# Patient Record
Sex: MALE | Race: WHITE | NOT HISPANIC OR LATINO | URBAN - METROPOLITAN AREA
[De-identification: names, ages, dates, MRNs, and addresses within clinical notes are randomized per-mention and may not be internally consistent; named-entity substitution may affect disease eponyms.]

---

## 2022-04-04 ENCOUNTER — TELEPHONE ENCOUNTER (OUTPATIENT)
Dept: URBAN - METROPOLITAN AREA CLINIC 72 | Facility: CLINIC | Age: 37
End: 2022-04-04

## 2022-08-25 ENCOUNTER — WEB ENCOUNTER (OUTPATIENT)
Dept: URBAN - METROPOLITAN AREA CLINIC 113 | Facility: CLINIC | Age: 37
End: 2022-08-25

## 2022-08-25 ENCOUNTER — OFFICE VISIT (OUTPATIENT)
Dept: URBAN - METROPOLITAN AREA CLINIC 113 | Facility: CLINIC | Age: 37
End: 2022-08-25
Payer: COMMERCIAL

## 2022-08-25 VITALS
WEIGHT: 208 LBS | SYSTOLIC BLOOD PRESSURE: 126 MMHG | HEIGHT: 68 IN | BODY MASS INDEX: 31.52 KG/M2 | TEMPERATURE: 98.4 F | DIASTOLIC BLOOD PRESSURE: 90 MMHG | HEART RATE: 86 BPM

## 2022-08-25 DIAGNOSIS — R19.4 CHANGE IN BOWEL HABITS: ICD-10-CM

## 2022-08-25 DIAGNOSIS — K21.9 GASTROESOPHAGEAL REFLUX DISEASE, UNSPECIFIED WHETHER ESOPHAGITIS PRESENT: ICD-10-CM

## 2022-08-25 DIAGNOSIS — K76.0 FATTY LIVER: ICD-10-CM

## 2022-08-25 DIAGNOSIS — R74.8 ELEVATED LIVER ENZYMES: ICD-10-CM

## 2022-08-25 PROCEDURE — 99204 OFFICE O/P NEW MOD 45 MIN: CPT | Performed by: NURSE PRACTITIONER

## 2022-08-25 RX ORDER — TELMISARTAN 40 MG/1
1 TABLET TABLET ORAL ONCE A DAY
Status: ACTIVE | COMMUNITY

## 2022-08-25 RX ORDER — AMLODIPINE BESYLATE 5 MG/1
1 TABLET TABLET ORAL ONCE A DAY
Status: ACTIVE | COMMUNITY

## 2022-08-25 RX ORDER — PANTOPRAZOLE SODIUM 40 MG/1
1 TABLET TABLET, DELAYED RELEASE ORAL ONCE A DAY
Qty: 30 | Refills: 5 | OUTPATIENT
Start: 2022-08-25

## 2022-08-25 RX ORDER — OMEPRAZOLE 40 MG/1
1 CAPSULE 30 MINUTES BEFORE MORNING MEAL CAPSULE, DELAYED RELEASE ORAL ONCE A DAY
Status: ACTIVE | COMMUNITY

## 2022-08-25 NOTE — HPI-OTHER HISTORIES
Labs 3/7/2022:BMP normal with exception of glucose 106.  LFTs: TB 0.5, ALP 99, , AST 55.  Lipid panel normal with exception of triglycerides 278 and HDL 31. Abdominal ultrasound 3/6/2022:Increased echogenicity of liver/hepatic steatosis.

## 2022-08-25 NOTE — HPI-TODAY'S VISIT:
This is a 37-year-old male with a history of hypertension, GERD, liver enzyme elevation, and fatty liver referred from PARMJIT Slaughter at [facility] for evaluation of fatty liver. He denies a prior history of liver enzyme elevation.  He denies a family history of liver disease.  He infrequently drinks alcohol and has not had any in a month.  He reports a history of acid reflux that began this year.  He has been on omeprazole 40 mg daily for the last 1 or 2 months and his symptoms are controlled.  For the last 2 months, he reports 2 or 3 bowel movements per day.  His stools are loose/watery and, when his stools are solid, they are thin.  He denies urgency to defecate or nocturnal stools.  This change in his bowel habits may began when he started omeprazole.  He infrequently ingests dairy products and denies NSAIDs.  He denies red blood per rectum or melena.  He denies any other abdominal symptoms.

## 2022-08-29 ENCOUNTER — TELEPHONE ENCOUNTER (OUTPATIENT)
Dept: URBAN - METROPOLITAN AREA CLINIC 113 | Facility: CLINIC | Age: 37
End: 2022-08-29

## 2022-10-11 LAB
ABSOLUTE BASOPHILS: 52
ABSOLUTE EOSINOPHILS: 291
ABSOLUTE LYMPHOCYTES: 1804
ABSOLUTE MONOCYTES: 525
ABSOLUTE NEUTROPHILS: 2527
ACTIN (SMOOTH MUSCLE) ANTIBODY (IGG): <20
ALBUMIN/GLOBULIN RATIO: 1.6
ALBUMIN: 4.5
ALKALINE PHOSPHATASE: 112
ALT (SGPT): 88
ANA SCREEN, IFA: NEGATIVE
AST (SGOT): 41
BASOPHILS: 1
BILIRUBIN, DIRECT: 0.2
BILIRUBIN, INDIRECT: 0.8
BILIRUBIN, TOTAL: 1
CERULOPLASMIN: 26
CLIENT CONTACT:: (no result)
COMMENT: (no result)
EOSINOPHILS: 5.6
FERRITIN, SERUM: 464
GLOBULIN: 2.9
HEMATOCRIT: 46.2
HEMOGLOBIN: 16.1
HEPATITIS A AB, TOTAL: (no result)
HEPATITIS B CORE AB TOTAL: (no result)
HEPATITIS B SURFACE AB IMMUNITY, QN: 47
HEPATITIS B SURFACE ANTIGEN: (no result)
HEPATITIS C ANTIBODY: (no result)
HEREDITARY HEMOCHROMATOSIS DNA MUT: (no result)
INDEX: 0.03
INR: 1.1
IRON BIND.CAP.(TIBC): 387
IRON SATURATION: 42
IRON: 164
LYMPHOCYTES: 34.7
Lab: (no result)
Lab: (no result)
MCH: 30.3
MCHC: 34.8
MCV: 87
MITOCHONDRIAL (M2) ANTIBODY: (no result)
MONOCYTES: 10.1
MPV: 8.1
NEUTROPHILS: 48.6
PLATELET COUNT: 248
PROTEIN, TOTAL: 7.4
PT: 10.9
RDW: 13
RED BLOOD CELL COUNT: 5.31
REPORT ALWAYS MESSAGE SIGNATURE: (no result)
T-TRANSGLUTAMINASE (TTG) IGA: <1
TEST CODE:: (no result)
TEST NAME:: (no result)
WHITE BLOOD CELL COUNT: 5.2

## 2022-10-28 ENCOUNTER — OFFICE VISIT (OUTPATIENT)
Dept: URBAN - METROPOLITAN AREA CLINIC 113 | Facility: CLINIC | Age: 37
End: 2022-10-28
Payer: COMMERCIAL

## 2022-10-28 VITALS
HEART RATE: 89 BPM | BODY MASS INDEX: 32.28 KG/M2 | SYSTOLIC BLOOD PRESSURE: 140 MMHG | HEIGHT: 68 IN | TEMPERATURE: 97.3 F | RESPIRATION RATE: 18 BRPM | WEIGHT: 213 LBS | DIASTOLIC BLOOD PRESSURE: 102 MMHG

## 2022-10-28 DIAGNOSIS — R19.4 CHANGE IN BOWEL HABITS: ICD-10-CM

## 2022-10-28 DIAGNOSIS — K21.9 GASTROESOPHAGEAL REFLUX DISEASE, UNSPECIFIED WHETHER ESOPHAGITIS PRESENT: ICD-10-CM

## 2022-10-28 DIAGNOSIS — R79.89 ELEVATED FERRITIN: ICD-10-CM

## 2022-10-28 DIAGNOSIS — K76.0 FATTY LIVER: ICD-10-CM

## 2022-10-28 DIAGNOSIS — R74.8 ELEVATED LIVER ENZYMES: ICD-10-CM

## 2022-10-28 PROBLEM — 365799007: Status: ACTIVE | Noted: 2022-08-29

## 2022-10-28 PROBLEM — 235595009: Status: ACTIVE | Noted: 2022-08-25

## 2022-10-28 PROBLEM — 707724006: Status: ACTIVE | Noted: 2022-08-29

## 2022-10-28 PROCEDURE — 99213 OFFICE O/P EST LOW 20 MIN: CPT | Performed by: NURSE PRACTITIONER

## 2022-10-28 RX ORDER — AMLODIPINE BESYLATE 5 MG/1
1 TABLET TABLET ORAL ONCE A DAY
Status: ACTIVE | COMMUNITY

## 2022-10-28 RX ORDER — PANTOPRAZOLE SODIUM 40 MG/1
1 TABLET TABLET, DELAYED RELEASE ORAL ONCE A DAY
Qty: 30 | Refills: 5 | Status: ACTIVE | COMMUNITY
Start: 2022-08-25

## 2022-10-28 RX ORDER — TELMISARTAN 40 MG/1
1 TABLET TABLET ORAL ONCE A DAY
Status: ACTIVE | COMMUNITY

## 2022-10-28 NOTE — HPI-OTHER HISTORIES
Labs  8/25/2022 were negative for viral, autoimmune, or metabolic sources of liver disease.  Ferritin was mildly elevated at 464 and iron studies were normal.  Hemochromatosis testing was requested on the specimen but could not be performed.  LFTs demonstrated mild liver enzyme elevation (TB 1.0, , ALT 88, AST 41). 3/7/2022:BMP normal with exception of glucose 106.  LFTs: TB 0.5, ALP 99, , AST 55.  Lipid panel normal with exception of triglycerides 278 and HDL 31. Abdominal ultrasound 3/6/2022:Increased echogenicity of liver/hepatic steatosis.

## 2022-10-28 NOTE — HPI-TODAY'S VISIT:
This is a 37-year-old male with a history of hypertension, GERD, liver enzyme elevation, and fatty liver presenting for follow-up. He was initially seen 8/25/2022 referred from PARMJIT Slaughter for evaluation of fatty liver.  He denied a prior history of liver enzyme elevation and denied a family history of liver disease.  He infrequently consumed alcohol and had not had any in a month.  He reported a 1 year history of acid reflux.  He had been taking omeprazole 40 mg for 1 to 2 months and his symptoms were controlled.  He had experienced a 2 months change in bowel habits reporting loose or watery bowel movements occurring 2-3 times per day.  It was discussed that liver enzyme elevation was likely associated with fatty liver.  Additional labs were ordered to reassess liver enzymes and assess synthetic liver function and screen for autoimmune, hereditary, metabolic, or viral sources of liver disease.  He was instructed to begin efforts at weight reduction.  It was discussed that change in bowel habits may have been associated with a side effect of omeprazole.  PPI was changed to pantoprazole.  He was to begin Benefiber 2 tablespoons daily if his symptoms did not improve. Labs 8/25/2022 were negative for viral, autoimmune, or metabolic sources of liver disease.  Ferritin was mildly elevated at 464 and iron studies were normal.  Hemochromatosis testing was requested on the specimen if this could be performed.  There are no results for review.  LFTs demonstrated mild liver enzyme elevation (TB 1.0, , ALT 88, AST 41). He is taking pantoprazole 40 mg daily.  He reports better control of acid reflux.  He continues to have a change in bowel habits.  He has 2-3 stools per day.  His stools began in solid and may progress to watery.  He has decreased ingestion of dairy products.  Alcohol use is infrequent.  He denies red blood per rectum or melena.  His weight has increased 5 pounds since his last visit. Hypertension and hyperlipidemia are treated by Ms. Collins at [facility].

## 2022-11-11 LAB
ALBUMIN/GLOBULIN RATIO: 1.6
ALBUMIN: 4.7
ALKALINE PHOSPHATASE: 114
ALT (SGPT): 82
AST (SGOT): 38
BILIRUBIN, DIRECT: 0.2
BILIRUBIN, INDIRECT: 0.6
BILIRUBIN, TOTAL: 0.8
GLOBULIN: 3
HEREDITARY HEMOCHROMATOSIS DNA MUT: (no result)
PROTEIN, TOTAL: 7.7

## 2023-02-06 ENCOUNTER — OFFICE VISIT (OUTPATIENT)
Dept: URBAN - METROPOLITAN AREA CLINIC 113 | Facility: CLINIC | Age: 38
End: 2023-02-06
Payer: COMMERCIAL

## 2023-02-06 ENCOUNTER — LAB OUTSIDE AN ENCOUNTER (OUTPATIENT)
Dept: URBAN - METROPOLITAN AREA CLINIC 113 | Facility: CLINIC | Age: 38
End: 2023-02-06

## 2023-02-06 ENCOUNTER — DASHBOARD ENCOUNTERS (OUTPATIENT)
Age: 38
End: 2023-02-06

## 2023-02-06 VITALS
SYSTOLIC BLOOD PRESSURE: 142 MMHG | RESPIRATION RATE: 20 BRPM | BODY MASS INDEX: 30.96 KG/M2 | WEIGHT: 204.3 LBS | HEIGHT: 68 IN | TEMPERATURE: 97.3 F | HEART RATE: 96 BPM | DIASTOLIC BLOOD PRESSURE: 93 MMHG

## 2023-02-06 DIAGNOSIS — R10.11 RUQ ABDOMINAL PAIN: ICD-10-CM

## 2023-02-06 DIAGNOSIS — K21.9 GASTROESOPHAGEAL REFLUX DISEASE WITHOUT ESOPHAGITIS: ICD-10-CM

## 2023-02-06 DIAGNOSIS — R19.4 CHANGE IN BOWEL HABITS: ICD-10-CM

## 2023-02-06 DIAGNOSIS — K76.0 FATTY LIVER: ICD-10-CM

## 2023-02-06 PROCEDURE — 99214 OFFICE O/P EST MOD 30 MIN: CPT | Performed by: NURSE PRACTITIONER

## 2023-02-06 RX ORDER — PANTOPRAZOLE SODIUM 40 MG/1
1 TABLET TABLET, DELAYED RELEASE ORAL ONCE A DAY
Qty: 30 | Refills: 5 | Status: ACTIVE | COMMUNITY
Start: 2022-08-25

## 2023-02-06 RX ORDER — AMLODIPINE BESYLATE 5 MG/1
1 TABLET TABLET ORAL ONCE A DAY
Status: ACTIVE | COMMUNITY

## 2023-02-06 RX ORDER — TELMISARTAN 40 MG/1
1 TABLET TABLET ORAL ONCE A DAY
Status: ACTIVE | COMMUNITY

## 2023-02-06 NOTE — HPI-TODAY'S VISIT:
This is a 38-year-old male with a history of hypertension, GERD, liver enzyme elevation likely associated with fatty liver, and change in bowel habits described as frequent stools progressing from formed to loose or watery possibly associated with a functional bowel disorder presenting for follow-up. He was last seen 10/28/2022.  Evaluation for viral, autoimmune, and metabolic sources of liver disease was negative.  He was immune to hepatitis B and not to hepatitis A.  He was instructed to discuss hepatitis A vaccination with his primary care provider.  Ferritin was mildly elevated and iron studies were normal.  Dr. Juárez recommended hemochromatosis genetic testing which was ordered.  Liver enzymes were repeated.  He was to begin daily fiber for a change in bowel habits.  GERD was controlled with pantoprazole 40 mg daily.  Medication holiday was a consideration at follow-up. He has been taking daily fiber.  He reports improvement of loose bowel movements.  He is having 1 or 2 stools per day.  When his stools are solid they remain ribbon shaped.  He denies red blood per rectum or melena.  He is exercising and has not modified his diet.  He has been losing weight.  He has occasional sharp pain indicated in the right upper quadrant.  This occurs at random and lasts less than 1 minute.  It occurs most days and is unassociated with meals or defecation.  It typically occurs when he is moving about.  GERD is controlled with  pantoprazole 40 mg daily.

## 2023-02-11 PROBLEM — 266435005: Status: ACTIVE | Noted: 2023-02-11

## 2023-02-11 PROBLEM — 197321007: Status: ACTIVE | Noted: 2022-08-25

## 2023-03-03 PROBLEM — 129851009: Status: ACTIVE | Noted: 2022-08-25

## 2023-03-20 ENCOUNTER — TELEPHONE ENCOUNTER (OUTPATIENT)
Dept: URBAN - METROPOLITAN AREA CLINIC 113 | Facility: CLINIC | Age: 38
End: 2023-03-20

## 2023-03-21 ENCOUNTER — WEB ENCOUNTER (OUTPATIENT)
Dept: URBAN - METROPOLITAN AREA SURGERY CENTER 25 | Facility: SURGERY CENTER | Age: 38
End: 2023-03-21

## 2023-03-23 ENCOUNTER — CLAIMS CREATED FROM THE CLAIM WINDOW (OUTPATIENT)
Dept: URBAN - METROPOLITAN AREA CLINIC 4 | Facility: CLINIC | Age: 38
End: 2023-03-23
Payer: COMMERCIAL

## 2023-03-23 ENCOUNTER — OFFICE VISIT (OUTPATIENT)
Dept: URBAN - METROPOLITAN AREA SURGERY CENTER 25 | Facility: SURGERY CENTER | Age: 38
End: 2023-03-23

## 2023-03-23 ENCOUNTER — CLAIMS CREATED FROM THE CLAIM WINDOW (OUTPATIENT)
Dept: URBAN - METROPOLITAN AREA SURGERY CENTER 25 | Facility: SURGERY CENTER | Age: 38
End: 2023-03-23
Payer: COMMERCIAL

## 2023-03-23 DIAGNOSIS — K63.89 OTHER SPECIFIED DISEASES OF INTESTINE: ICD-10-CM

## 2023-03-23 DIAGNOSIS — R19.7 CHRONIC DIARRHEA: ICD-10-CM

## 2023-03-23 PROCEDURE — 45380 COLONOSCOPY AND BIOPSY: CPT | Performed by: INTERNAL MEDICINE

## 2023-03-23 PROCEDURE — 88305 TISSUE EXAM BY PATHOLOGIST: CPT | Performed by: PATHOLOGY

## 2023-03-23 PROCEDURE — 88342 IMHCHEM/IMCYTCHM 1ST ANTB: CPT | Performed by: PATHOLOGY

## 2023-03-23 PROCEDURE — G8907 PT DOC NO EVENTS ON DISCHARG: HCPCS | Performed by: INTERNAL MEDICINE

## 2023-03-23 PROCEDURE — 88313 SPECIAL STAINS GROUP 2: CPT | Performed by: PATHOLOGY

## 2023-03-23 RX ORDER — TELMISARTAN 40 MG/1
1 TABLET TABLET ORAL ONCE A DAY
Status: ACTIVE | COMMUNITY

## 2023-03-23 RX ORDER — AMLODIPINE BESYLATE 5 MG/1
1 TABLET TABLET ORAL ONCE A DAY
Status: ACTIVE | COMMUNITY

## 2023-03-23 RX ORDER — PANTOPRAZOLE SODIUM 40 MG/1
1 TABLET TABLET, DELAYED RELEASE ORAL ONCE A DAY
Qty: 30 | Refills: 5 | Status: ACTIVE | COMMUNITY
Start: 2022-08-25

## 2023-04-17 ENCOUNTER — OFFICE VISIT (OUTPATIENT)
Dept: URBAN - METROPOLITAN AREA CLINIC 113 | Facility: CLINIC | Age: 38
End: 2023-04-17

## 2023-05-05 ENCOUNTER — OFFICE VISIT (OUTPATIENT)
Dept: URBAN - METROPOLITAN AREA CLINIC 113 | Facility: CLINIC | Age: 38
End: 2023-05-05